# Patient Record
Sex: MALE | Race: ASIAN | NOT HISPANIC OR LATINO | URBAN - METROPOLITAN AREA
[De-identification: names, ages, dates, MRNs, and addresses within clinical notes are randomized per-mention and may not be internally consistent; named-entity substitution may affect disease eponyms.]

---

## 2022-03-15 NOTE — ASU PATIENT PROFILE, ADULT - FALL HARM RISK - UNIVERSAL INTERVENTIONS
Bed in lowest position, wheels locked, appropriate side rails in place/Call bell, personal items and telephone in reach/Instruct patient to call for assistance before getting out of bed or chair/Non-slip footwear when patient is out of bed/[place] to call system/Physically safe environment - no spills, clutter or unnecessary equipment/Purposeful Proactive Rounding/Room/bathroom lighting operational, light cord in reach

## 2022-03-16 ENCOUNTER — OUTPATIENT (OUTPATIENT)
Dept: OUTPATIENT SERVICES | Facility: HOSPITAL | Age: 27
LOS: 1 days | Discharge: HOME | End: 2022-03-16

## 2022-03-16 VITALS
SYSTOLIC BLOOD PRESSURE: 129 MMHG | RESPIRATION RATE: 16 BRPM | HEIGHT: 68 IN | WEIGHT: 139.55 LBS | OXYGEN SATURATION: 100 % | DIASTOLIC BLOOD PRESSURE: 78 MMHG | HEART RATE: 55 BPM | TEMPERATURE: 99 F

## 2022-03-16 VITALS
SYSTOLIC BLOOD PRESSURE: 116 MMHG | OXYGEN SATURATION: 99 % | HEART RATE: 68 BPM | RESPIRATION RATE: 15 BRPM | DIASTOLIC BLOOD PRESSURE: 66 MMHG

## 2022-03-16 RX ORDER — HYDROMORPHONE HYDROCHLORIDE 2 MG/ML
0.25 INJECTION INTRAMUSCULAR; INTRAVENOUS; SUBCUTANEOUS
Refills: 0 | Status: DISCONTINUED | OUTPATIENT
Start: 2022-03-16 | End: 2022-03-16

## 2022-03-16 RX ORDER — SODIUM CHLORIDE 9 MG/ML
500 INJECTION, SOLUTION INTRAVENOUS
Refills: 0 | Status: DISCONTINUED | OUTPATIENT
Start: 2022-03-16 | End: 2022-03-16

## 2022-03-16 RX ORDER — SODIUM CHLORIDE 9 MG/ML
1000 INJECTION, SOLUTION INTRAVENOUS ONCE
Refills: 0 | Status: COMPLETED | OUTPATIENT
Start: 2022-03-16 | End: 2022-03-16

## 2022-03-16 RX ORDER — APREPITANT 80 MG/1
40 CAPSULE ORAL ONCE
Refills: 0 | Status: COMPLETED | OUTPATIENT
Start: 2022-03-16 | End: 2022-03-16

## 2022-03-16 RX ORDER — OXYCODONE AND ACETAMINOPHEN 5; 325 MG/1; MG/1
1 TABLET ORAL EVERY 4 HOURS
Refills: 0 | Status: DISCONTINUED | OUTPATIENT
Start: 2022-03-16 | End: 2022-03-16

## 2022-03-16 RX ORDER — DESVENLAFAXINE 50 MG/1
1 TABLET, EXTENDED RELEASE ORAL
Qty: 0 | Refills: 0 | DISCHARGE

## 2022-03-16 RX ORDER — QUETIAPINE FUMARATE 200 MG/1
25 TABLET, FILM COATED ORAL
Qty: 0 | Refills: 0 | DISCHARGE

## 2022-03-16 RX ORDER — FENTANYL CITRATE 50 UG/ML
25 INJECTION INTRAVENOUS
Refills: 0 | Status: DISCONTINUED | OUTPATIENT
Start: 2022-03-16 | End: 2022-03-16

## 2022-03-16 RX ORDER — ACETAMINOPHEN 500 MG
975 TABLET ORAL ONCE
Refills: 0 | Status: COMPLETED | OUTPATIENT
Start: 2022-03-16 | End: 2022-03-16

## 2022-03-16 RX ADMIN — SODIUM CHLORIDE 1000 MILLILITER(S): 9 INJECTION, SOLUTION INTRAVENOUS at 18:21

## 2022-03-16 RX ADMIN — APREPITANT 40 MILLIGRAM(S): 80 CAPSULE ORAL at 11:49

## 2022-03-16 RX ADMIN — FENTANYL CITRATE 25 MICROGRAM(S): 50 INJECTION INTRAVENOUS at 18:21

## 2022-03-16 RX ADMIN — Medication 975 MILLIGRAM(S): at 11:49

## 2022-03-16 RX ADMIN — FENTANYL CITRATE 25 MICROGRAM(S): 50 INJECTION INTRAVENOUS at 17:40

## 2022-03-16 NOTE — ASU DISCHARGE PLAN (ADULT/PEDIATRIC) - CARE PROVIDER_API CALL
Ney Galeas  PLASTIC SURGERY  [address] [place] [address]  Phone: [phone]  Fax: [phone]  Follow Up Time:

## 2022-03-16 NOTE — ASU DISCHARGE PLAN (ADULT/PEDIATRIC) - NS MD DC FALL RISK RISK
For information on Fall & Injury Prevention, visit: [email or web address] OR  [email or web address] OR  https://www.cdc.gov/steadi/patient.html

## 2023-03-29 NOTE — ASU DISCHARGE PLAN (ADULT/PEDIATRIC) - "IF YOU OR YOUR GUARDIAN/FAMILY IS A SMOKER, IT IS IMPORTANT FOR YOUR HEALTH TO STOP SMOKING. PLEASE BE AWARE THAT SECOND HAND SMOKE IS ALSO HARMFUL."
Medical Weight Loss - Follow up Visit      ASSESSMENT/PLAN:   Insulin resistance  BEN IR 2.4. Patient tolerating Wegovy without any noted side effects, will increase dosage as below. Will monitor for improvement over the course of weight loss therapy. Would benefit from weight loss to address this issue. A low carbohydrate diet would be beneficial for this as well.    - Semaglutide-Weight Management (Wegovy) 1 MG/0.5ML Solution Auto-injector; Inject 1 mg into the skin every 7 days.  Dispense: 2 mL; Refill: 1    Class 2 severe obesity due to excess calories with serious comorbidity and body mass index (BMI) of 37.0 to 37.9 in adult (CMD)  Improving. Patient was successfully able to lose 11 pounds over the past 6 weeks since starting the program. She was congratulated on this effort thus far and encouraged to continue. She is motivated to continue to make changes and the plan is as outlined below.         Essential hypertension, benign  At goal.  Continue medication as prescribed by PCP.  Will monitor for improvement over the course of weight loss therapy. Would benefit from weight loss to address this issue.     Mixed hyperlipidemia  Ongoing. Will monitor for improvement over the course of weight loss therapy. Would benefit from weight loss to address this issue. A low carbohydrate diet would be beneficial for this as well.    Vitamin D deficiency  Complete course of Ergocalciferol and continue with vitamin D3 5000 units once daily. Will repeat vitamin D level prior to next office visit.     - VITAMIN D -25 HYDROXY; Future    Diet logs were not provided and patient was encouraged to keep them for next visit as this will aid diet adherence. Exercise logs were not provided and patient was encouraged to keep them for next visit. Feedback was provided to patient on where the carbohydrate intake is impeding fat loss. Discussed specific foods which have caused problems for patient and alternatives were discussed.    Recommendations were made to alter dietary intake to improve weight loss. Recommendations were made to alter exercise pattern to improve weight loss. New, achievable goals were set by patient as outlined below. Patient was an active participant in the conversation and agrees to try recommendations. Patient verbalized understanding of recommendation and agrees to call with further questions. Questions were answered over the course of the individual portion of the visit.    GOALS:   Patient Instructions   Finish Ergocalciferol (Vitamin D2) then continue with vitamin D3 5000 units once daily    Complete the box of the 0.5 mg dosage then increase Wegovy to 1 mg weekly.     Goals for Weight Loss:    Decrease Sugars and Sweets:  Limit carbohydrates to 10 grams per meal.  Avoid artificial sweeteners, as they can slow weight loss.      Your body can only process 12 g of carbohydrate at a time, and the rest is stored, making you gain weight.  Eat more protein - meats, eggs, cheese - to stay full. Remember, fruits and vegetables can be high in carbohydrates  Read the nutrition labels on food and pay attention to portion sizes.      Weight loss:  4-8 pounds in 1 month.    Goal BMI is <30, yours is currently Body mass index is 37.04 kg/m².    • Drink plenty of water  • Eat more protein - meats, seafood, cheese, eggs  • Eating fats are fine - oils, whipped cream, tashi cheese dressing  • Fruits and Vegetables can be high in carbohydrates  • NO juices, sweet teas, NO ARTIFICIAL SWEETENERS  • NO starches - breads, pasta, rice, pizza, potatoes  • “Low fat” products usually are high in carbohydrates - watch those labels  • READ NUTRITION LABELS  • Google EVERYTHING you eat:  “how many grams of carbs in _____”  • Add broth daily    Keep a journal:  Write down your foods and beverages before you eat.  Make sure to add in your exercise as above. May use the Calorie Jewel book for carbohydrate counting.      Exercise:   10,000 Steps  Daily  150 minutes of exercise weekly. Can break this down into 30 minutes 5 days a week, or into smaller segments as tolerated.    Ultimate goal is 10,000 steps daily. Use pedometer to count your steps. If you are not achieving the 10,000 steps today, increase your steps gradually so that you can achieve that goal. Look at your average number of steps over the course of 1 week. Increase your goal by 1000 steps daily for 2 weeks, and continue to do this until you achieve the 10,000 steps daily goal.  Try to stand rather than sit as much as possible.        Alcohol:  No more than 2 standard drinks per day or 7 standard drinks per week. These are empty calories, with no nutritional benefit. Your body burns alcohol before fat and will slow your weight loss.    Tobacco:  You are currently tobacco free. Keep it up!    Websites:  www.[employer]  http://[employer]/od/atkinsdiet/a/atSeal Softwarefoodlists.htm  www.[employer]    https://recipes.[employer]/great-recipes.asp?food=atkins+induction  Apps for Low Carbohydrate eating:   Diet Logbooks  [employer] Carb Tracker  Carb Manager  Low Carb Diet Assistant   Exercise and Diet  Calorie Counter & Diet Tracker by [employer] Pal  Glycemic Index:  Low GI Diet  Low GI Diet Tracker  Recipes:   [employer] Carb Tracker   D[employer] Endoscopybs.Optimata   Lowcarbyum.Optimata   AtPostedIn   Ibreatheimhungry.PassivSystems   *Search for recipes with <10 grams of carbohydrate*  Recipe Builders:  Recipe Builder Pro    Return in about 2 months (around 5/29/2023) for Follow up Weight Management.       Needed Referrals:   None    A copy of this note was sent to Anival Martínez DO.    Harriet Campos DNP, APNP, FNP-BC    Total time spent today on this visit  is  30 minutes which includes  preparing to see the patient by reviewing prior records, obtaining and reviewing history, performing a physical exam, counseling the patient ,documenting clinical information in  the medical record,ordering medications/tests/procedures, independently interpreting test results that are not separately billed, communicating test results to the patient and coordinating care    Chief Complaint   Patient presents with   • Medical Weight Management     Follow up: Insulin resistance     History of Present Illness:   Elvie Aviles is a 41 year old female. The primary encounter diagnosis was Insulin resistance. Diagnoses of Class 2 severe obesity due to excess calories with serious comorbidity and body mass index (BMI) of 37.0 to 37.9 in adult (CMD), Essential hypertension, benign, Vitamin D deficiency, and Mixed hyperlipidemia were also pertinent to this visit..  She is focusing on weight management for improvement in overall health status.    Date of initial consultation  [facility] Extended Vitals - Weight in Kg/Lb 2/10/2023   Weight kg 88.134 kg   Weight lb 194 lb 4.8 oz   Height 4' 11\"   Height cm 149.9 cm   BMI 39.24     Diet tolerance: \"so far so good\"  Weight loss medication: Yes  Logging food: No  Carbohydrates per meal: 10 or less  Foods chosen: sausage, cheese, eggs, tangerine (1), shrimp, broccoli, steak, cauliflower, spinach, chicken, almonds, pistachios   Diet excursions: pasta, apples  Exercise: Elliptical and walking, 2 days per week   Wearing Pedometer: Yes  Steps per day: Unsure as she is not tracking  Testing Ketones: No  Results: NA    Diabetes: No  Home Regimen: NA  Hypoglycemia: No  Hypertension :Yes  Medications : amlodipine 10 mg daily, hydrochlorothiazide 50 mg daily, and irbesartan 300 mg nightly.  She tolerates medication well without any side effects or problems.  Hyperlipidemia: Yes  Medications: None  The 10-year ASCVD risk score (Cj DODSON, et al., 2019) is: 0.9%    Values used to calculate the score:      Age: 41 years      Sex: Female      Is Non- : Yes      Diabetic: No      Tobacco smoker: No      Systolic Blood Pressure: 124 mmHg      Is BP treated:  Yes      HDL Cholesterol: 71 mg/dL      Total Cholesterol: 244 mg/dL    Hypothyroidism:No  Levothyroxine dose: NA  TSH (mcUnits/mL)   Date Value   02/03/2023 1.378     Depression: No  Medications: NA  Stress Eating: No    Previous goals were set on 2/10/2023 for a(n) 4-8 pound weight loss and 5000 steps daily in 4 weeks.      Elvie Aviles has identified the following areas for change: Limit apples and pasta, otherwise continue with diet.     Insulin resistance: BEN IR 2.4. Identified on lab studies completed for initial consultation. Patient was started on Wegovy Inject 0.25 mg once every 7 days X 4 weeks, then increase to inject 0.5 mg every 7 days. Patient notes she is tolerating the medication, with no noted side effects.     Vitamin D deficiency currently on 5,000 units vitamin D3 daily and Ergocalciferol 50,000 units once weekly X 12 weeks.  Patient denies frequent bone fractures, muscle weakness, unexplained change in muscle strength. changes in mood, worsening anxiety or depression. Last vitamin D was   Vitamin D, 25-Hydroxy (ng/mL)   Date Value   02/03/2023 7.8 (L)       Review of Systems:   Completed by MA, I reviewed it with the patient and agree with it's content. Complaints are chronic or as mentioned in HPI.     Home Medications:   Current Outpatient Medications   Medication Sig   • Semaglutide-Weight Management (Wegovy) 1 MG/0.5ML Solution Auto-injector Inject 1 mg into the skin every 7 days.   • HYDROcodone-acetaminophen (Norco) 5-325 MG per tablet Take 1 tablet by mouth every 6 hours as needed for Pain.   • ergocalciferol (DRISDOL) 1.25 mg (50,000 units) capsule Take 1 capsule by mouth 1 day a week for 12 doses.   • amLODIPine (NORVASC) 10 MG tablet Take 1 tablet by mouth daily.   • irbesartan (AVAPRO) 300 MG tablet Take 1 tablet by mouth nightly.   • hydrochlorothiazide (HYDRODIURIL) 50 MG tablet Take 1 tablet by mouth daily.     No current facility-administered medications for this visit.        Past Social History:   Social History     Socioeconomic History   • Marital status: Single     Spouse name: Not on file   • Number of children: Not on file   • Years of education: Not on file   • Highest education level: Not on file   Occupational History   • Not on file   Tobacco Use   • Smoking status: Former     Types: Cigarettes     Quit date: 2018     Years since quittin.8   • Smokeless tobacco: Never   • Tobacco comments:     Neck 39cm.   Vaping Use   • Vaping Use: never used   Substance and Sexual Activity   • Alcohol use: Yes     Comment: Social, about 2 drinks month   • Drug use: Not Currently     Types: Marijuana     Comment: last used MJ    • Sexual activity: Yes     Partners: Male     Birth control/protection: None   Other Topics Concern   • Not on file   Social History Narrative   • Not on file     Social Determinants of Health     Financial Resource Strain: Not on file   Food Insecurity: Not on file   Transportation Needs: Not on file   Physical Activity: Not on file   Stress: Not on file   Social Connections: Not on file   Intimate Partner Violence: Not At Risk   • Social Determinants: Intimate Partner Violence Past Fear: No   • Social Determinants: Intimate Partner Violence Current Fear: No       Past Medical & Surgical History:    has a past medical history of Essential (primary) hypertension, Focal neurological signs (10/10/2012), and Pap smear abnormality of cervix/human papillomavirus (HPV) positive.    Past Surgical History:   Procedure Laterality Date   •  postpartum care increased service     •  section, low transverse      X3   • Colposcopy  2017    Dinorah Arevalo MD   • Remove intrauterine device  2017   • Upper gi endoscopy,tumor ablatn  10/12/2016    EGD with biopsy performed by Sukh Matthews MD at [facility]       Physical Examination:   GENERAL:  She is a 41 year old female   VITAL SIGNS:  Blood pressure 124/78, pulse 90, height 4' 11\"  (1.499 m), weight 83.2 kg (183 lb 6.4 oz), last menstrual period 09/10/2022. Body mass index is 37.04 kg/m².  Wt Readings from Last 4 Encounters:   03/29/23 83.2 kg (183 lb 6.4 oz)   02/16/23 87.2 kg (192 lb 3.9 oz)   02/10/23 88.1 kg (194 lb 4.8 oz)   02/03/23 90.7 kg (200 lb)     Waist: 41.75 inches   Ideal body weight: 44.3 kg (97 lb 10.6 oz)  Adjusted ideal body weight: 59.9 kg (131 lb 15.3 oz)    Body Composition  InBody 570     Intracellular water = 41.7 lbs  Extracellular Water = 24.7 lbs  Dry Lean Mass = 23.4 lbs  Body Fat Mass = 93.7 lbs     Total Body Water = 66.4 lbs  Lean Body Mass = 89.7 lbs  Weight = 183.4 lbs     Skeletal Muscle Mass 49.8 lbs     Percent Body Fat =  51.1 %  Extracellular water/Total Body Water = 0.373  Visceral Fat Level = 20    SKIN:  Warm and hydrated without evidence of skin rashes or abnormal lesions.  No palpable skin abnormalities.   RESPIRATORY:  Clear to auscultation with no wheezing or rales. Nonlabored respirations  CARDIOVASCULAR:  Regular S1, S2 without murmur or rub. No pedal edema. 2+ peripheral pulses.  ABDOMEN:  Soft, nontender, nondistended. Positive bowel sounds.   PSYCHIATRIC:  Mood and affect are normal. Judgement and insight are intact.  Recent memory of diet and exercise is intact. Upon direct questioning, patient denies suicidal ideation    LABS:   Lab Results   Component Value Date    SODIUM 141 03/22/2023    POTASSIUM 3.9 03/22/2023    CHLORIDE 100 03/22/2023    CO2 32 03/22/2023    GLUCOSE 87 03/22/2023    BUN 21 (H) 03/22/2023    CREATININE 1.10 (H) 03/22/2023    ALBUMIN 3.8 02/03/2023    BILIRUBIN 0.5 02/03/2023    AST 12 02/03/2023    PHOS 2.9 05/12/2020     Lab Results   Component Value Date    WBC 6.6 02/03/2023    HGB 11.6 (L) 02/03/2023    HCT 36.0 02/03/2023     02/03/2023    TSH 1.378 02/03/2023     Hemoglobin A1C (%)   Date Value   05/12/2020 5.6     Cholesterol (mg/dL)   Date Value   10/21/2022 244 (H)     HDL (mg/dL)   Date Value    10/21/2022 71     Cholesterol/ HDL Ratio (no units)   Date Value   10/21/2022 3.4     Triglycerides (mg/dL)   Date Value   10/21/2022 107     LDL (mg/dL)   Date Value   10/21/2022 152 (H)     Vitamin D, 25-Hydroxy (ng/mL)   Date Value   02/03/2023 7.8 (L)     Uric Acid (mg/dL)   Date Value   02/03/2023 5.5     Insulin, Fasting (mUnits/L)   Date Value   02/03/2023 11     Glucose (mg/dL)   Date Value   03/22/2023 87     2/3/2023 BEN IR 2.4   Statement Selected

## (undated) DEVICE — SYR LUER LOK 3CC

## (undated) DEVICE — SUT ETHILON 5-0 18" P-3

## (undated) DEVICE — DRSG DERMABOND 0.7ML

## (undated) DEVICE — SUT MONOCRYL 5-0 18" P-3 UNDYED

## (undated) DEVICE — DRSG PACKING NASAL DEROYAL COTTON STRIP

## (undated) DEVICE — DRSG TELFA 3 X 8

## (undated) DEVICE — APPLICATOR COTTON TIP 6"

## (undated) DEVICE — PACK RHINOPLASTY

## (undated) DEVICE — NDL HYPO SAFE 25G X 5/8" (ORANGE)

## (undated) DEVICE — ELCTR BOVIE PENCIL BLADE 10FT

## (undated) DEVICE — SUT CHROMIC 4-0 18" G-3

## (undated) DEVICE — WARMING BLANKET LOWER ADULT

## (undated) DEVICE — SLV COMPRESSION KNEE MED

## (undated) DEVICE — SUT PLAIN GUT 4-0 18" SC-1

## (undated) DEVICE — GLV 7 PROTEXIS (WHITE)

## (undated) DEVICE — MARKING PEN W RULER